# Patient Record
Sex: MALE | Race: WHITE | NOT HISPANIC OR LATINO | Employment: OTHER | URBAN - METROPOLITAN AREA
[De-identification: names, ages, dates, MRNs, and addresses within clinical notes are randomized per-mention and may not be internally consistent; named-entity substitution may affect disease eponyms.]

---

## 2019-02-14 ENCOUNTER — HOSPITAL ENCOUNTER (EMERGENCY)
Facility: HOSPITAL | Age: 84
End: 2019-02-14
Attending: EMERGENCY MEDICINE | Admitting: EMERGENCY MEDICINE

## 2019-02-14 VITALS — HEART RATE: 22 BPM

## 2019-02-14 DIAGNOSIS — I46.9 CARDIOPULMONARY ARREST (HCC): Primary | ICD-10-CM

## 2019-02-14 PROCEDURE — 31500 INSERT EMERGENCY AIRWAY: CPT

## 2019-02-14 PROCEDURE — 94799 UNLISTED PULMONARY SVC/PX: CPT

## 2019-02-14 PROCEDURE — 99284 EMERGENCY DEPT VISIT MOD MDM: CPT

## 2019-02-14 PROCEDURE — 25010000002 EPINEPHRINE PF 1 MG/10ML SOLUTION PREFILLED SYRINGE: Performed by: EMERGENCY MEDICINE

## 2019-02-14 PROCEDURE — 31500 INSERT EMERGENCY AIRWAY: CPT | Performed by: EMERGENCY MEDICINE

## 2019-02-14 PROCEDURE — 92950 HEART/LUNG RESUSCITATION CPR: CPT

## 2019-02-14 PROCEDURE — 99282 EMERGENCY DEPT VISIT SF MDM: CPT | Performed by: EMERGENCY MEDICINE

## 2019-02-14 RX ADMIN — EPINEPHRINE 1 MG: 0.1 INJECTION, SOLUTION ENDOTRACHEAL; INTRACARDIAC; INTRAVENOUS at 18:00

## 2019-02-14 NOTE — ED NOTES
Pt arrived via [facility] full arrest. CPR in progress. Pt to trauma room , CAMPOS Denney,  KATHERIN soria RN, Kathy Woody RN, [place] COLLINS, Valerie Santiago, Resp. Therapy present. Pt bagged and placed on defib pads, monitor, resp. Bagging pt.      Fariba Soria, KARINA  02/14/19 7022

## 2019-02-14 NOTE — ED PROVIDER NOTES
EMERGENCY DEPARTMENT ENCOUNTER      Room Number: TRAU/TR    History provided by EMS was very limited as bystanders did not provide much history either.  HPI:    Chief complaint: Cardiopulmonary arrest      Narrative: Pt is a 89 y.o. male presents via [facility] EMS after being found down.  Unknown downtime prior to being found.  Patient had no signs of lividity so CPR was initiated.  Patient received no shocks, IV or medications prior to arrival during more than a 20-minute transport time.  Only known history is cardiac as he has a sternotomy scar.    PMD: No primary care provider on file.    REVIEW OF SYSTEMS  Review of Systems  Unable to obtain secondary to acuity of patient's status  PAST MEDICAL HISTORY  Active Ambulatory Problems     Diagnosis Date Noted   • No Active Ambulatory Problems     Resolved Ambulatory Problems     Diagnosis Date Noted   • No Resolved Ambulatory Problems     No Additional Past Medical History       PAST SURGICAL HISTORY  No past surgical history on file.    FAMILY HISTORY  No family history on file.    SOCIAL HISTORY  Social History     Socioeconomic History   • Marital status: Not on file     Spouse name: Not on file   • Number of children: Not on file   • Years of education: Not on file   • Highest education level: Not on file   Social Needs   • Financial resource strain: Not on file   • Food insecurity - worry: Not on file   • Food insecurity - inability: Not on file   • Transportation needs - medical: Not on file   • Transportation needs - non-medical: Not on file   Occupational History   • Not on file   Tobacco Use   • Smoking status: Not on file   Substance and Sexual Activity   • Alcohol use: Not on file   • Drug use: Not on file   • Sexual activity: Not on file   Other Topics Concern   • Not on file   Social History Narrative   • Not on file       ALLERGIES  Patient has no allergy information on record.    PHYSICAL EXAM      Physical Exam   Constitutional:   Unresponsive male   HENT:    Head: Atraumatic.   Eyes:   Pupils fixed and dilated   Cardiovascular:   No palpable pulse.   Pulmonary/Chest:   No pulmonary effort   Abdominal: Soft. He exhibits no distension.   Neurological:   Unresponsive to verbal and tactile stimuli.  No spontaneous movement   Psychiatric:   Unable to assess       LAB RESULTS  No results found for this or any previous visit.      I ordered the above labs and reviewed the results    RADIOLOGY  No results found.    I ordered the above radiologic testing and reviewed the results    PROCEDURES  Procedures      PROGRESS AND CONSULTS     IV access was initially established by Josefina Chapa PA-c via left proximal tibial bio through which first dose of epi was given.  IV access in the right upper extremity was thereafter established by nursing.    Josefina Chapa PA-C intubated the patient using video laryngoscopy with a 7.5 ET tube successfully on the first try.  A lateral breath sounds appreciated after intubation.    Initial presenting electrocardiogram rhythm in the emergency department was asystole.  After the second round of epi the patient was noted to have rare PEA.  Bedside ultrasound revealed no cardiac activity with this rhythm.    Given the unknown downtime, prolonged resuscitative efforts prior to arrival and poor response to resuscitative measures further effort was discontinued at 1808 and time of death was declared.      MEDICAL DECISION MAKING  Results were reviewed/discussed with the patient and they were also made aware of online access. Pt also made aware that some labs, such as cultures, will not be resulted during ER visit and follow up with PMD is necessary.     MDM       DIAGNOSIS  Final diagnoses:   Cardiopulmonary arrest (CMS/HCC)       Latest Documented Vital Signs:  As of 6:15 PM  BP-   HR-   Temp-   O2 sat-      DISPOSITION         Medication List      No changes were made to your prescriptions during this visit.                Giovani Null,  MD  02/14/19 9554

## 2019-02-14 NOTE — ED NOTES
IO placed by CAMPOS ROTH to lower left leg. Flushed with normal saline without difficulty. One epi 1mg given in IO without difficulty. CPR cont.      Fariba Soria RN  02/14/19 9874

## 2019-02-14 NOTE — ED NOTES
[facility] called pt ruled out for donation . Case ID #[id number]     Fariba Soria, KARINA  02/14/19 0925

## 2019-02-14 NOTE — ED NOTES
Pt daughter stated pt had just got home with sister from seeing his cardiologist. Pt went to bathroom and daughter heard a thump. [facility] called.     Fariba Soria, RN  02/14/19 9429